# Patient Record
Sex: FEMALE | Race: ASIAN | NOT HISPANIC OR LATINO | URBAN - METROPOLITAN AREA
[De-identification: names, ages, dates, MRNs, and addresses within clinical notes are randomized per-mention and may not be internally consistent; named-entity substitution may affect disease eponyms.]

---

## 2018-01-25 ENCOUNTER — EMERGENCY (EMERGENCY)
Facility: HOSPITAL | Age: 33
LOS: 1 days | Discharge: HOME | End: 2018-01-25
Admitting: EMERGENCY MEDICINE
Payer: OTHER MISCELLANEOUS

## 2018-01-25 VITALS
RESPIRATION RATE: 16 BRPM | TEMPERATURE: 98 F | HEART RATE: 88 BPM | OXYGEN SATURATION: 100 % | DIASTOLIC BLOOD PRESSURE: 71 MMHG | SYSTOLIC BLOOD PRESSURE: 119 MMHG

## 2018-01-25 PROCEDURE — 73100 X-RAY EXAM OF WRIST: CPT | Mod: 26,RT

## 2018-01-25 PROCEDURE — 99283 EMERGENCY DEPT VISIT LOW MDM: CPT | Mod: 25

## 2018-01-25 PROCEDURE — 29130 APPL FINGER SPLINT STATIC: CPT

## 2018-01-25 PROCEDURE — 73130 X-RAY EXAM OF HAND: CPT | Mod: 26,RT

## 2018-01-25 RX ORDER — ACETAMINOPHEN 500 MG
650 TABLET ORAL ONCE
Qty: 0 | Refills: 0 | Status: COMPLETED | OUTPATIENT
Start: 2018-01-25 | End: 2018-01-25

## 2018-01-25 RX ORDER — IBUPROFEN 200 MG
600 TABLET ORAL ONCE
Qty: 0 | Refills: 0 | Status: COMPLETED | OUTPATIENT
Start: 2018-01-25 | End: 2018-01-25

## 2018-01-25 RX ADMIN — Medication 600 MILLIGRAM(S): at 12:41

## 2018-01-25 RX ADMIN — Medication 650 MILLIGRAM(S): at 12:40

## 2018-01-25 NOTE — ED PROVIDER NOTE - MEDICAL DECISION MAKING DETAILS
33 y/o female with pmhx of asthma, presents to ED c/o traumatic right hand pain s/p training for self defense at her [employer] yesterday afternoon. plan: r/o fracture, pain control, reassess

## 2018-01-25 NOTE — ED PROVIDER NOTE - PHYSICAL EXAMINATION
Full ROM of right shoulder, elbow and wrist. Able to wiggle, flex and extend fingers. Capillary refill <2 seconds. Radial pulse 2+. Sensation intact throughout. +tenderness over 3rd and 4th MCP joints and metacarpal shafts. Minimal swelling/bruising. 5mm abrasion over 4th knuckle.

## 2018-01-25 NOTE — ED PROVIDER NOTE - OBJECTIVE STATEMENT
33 y/o female with pmhx of asthma, allergic to eggs, presents to ED c/o traumatic right hand pain s/p training for self defense at her [employer] yesterday afternoon. Right hand dominant. Pt punched a punching bag without gloves. c/o swelling and ecchymosis with a small abrasion. Applied ice right away, swelling improved. Declining tetanus shot due to being scared of needles. 10/10 pain. Did not take any meds today, took Tylenol yesterday with relief. No fever, chills, weakness, numbness, tingling, gross deformity, cyanosis, fall, further trauma or pain, or any other complaints. 33 y/o female with pmhx of asthma, presents to ED c/o traumatic right hand pain s/p training for self defense at her [employer] yesterday afternoon. Right hand dominant. Pt punched a punching bag without gloves. c/o swelling and ecchymosis of her 3rd and 4th knuckles with a small abrasion. Applied ice right away, swelling improved. Declining tetanus shot due to being scared of needles. 10/10 pain. Did not take any meds today, took Tylenol yesterday with relief. No fever, chills, weakness, numbness, tingling, gross deformity, cyanosis, fall, further trauma or pain, or any other complaints.

## 2018-01-25 NOTE — ED POST DISCHARGE NOTE - RESULT SUMMARY
TONY Landry: received call from Dr. Cotton for hand xray official report, possible lucency of 5th base of distal phalynx compared to hand xray from 2008. suggests to correlate clinically for possible fracture or may be incidental artifact. pt was not tender there on exam, full ROM, atraumatic. I called pt at [phone] and denies having any pain there, pain mostly near knuckles. advised to follow up with Orthopedics within 24-48 hours. Pt understands and is amenable with plan. Given return precautions.

## 2018-01-25 NOTE — ED PROVIDER NOTE - PROGRESS NOTE DETAILS
VIJAY Landry - pain well controlled. as per Dr. escobedo negative xrays. will f.u official report. pt amenable for dc with pcp follow up. Mohan Landry - spoke with hand Dr. Asher, plan to place finger splint and to follow up this week. pt amenable with plan. given contact information for office.

## 2018-01-25 NOTE — ED ADULT TRIAGE NOTE - CHIEF COMPLAINT QUOTE
Pt. c/o right hand pain around knuckles since yesterday. States she was punching a bag during training yesterday.  Icy hot applied last night and wrapped with ace bandage. Swelling has improved.

## 2018-06-03 ENCOUNTER — EMERGENCY (EMERGENCY)
Facility: HOSPITAL | Age: 33
LOS: 1 days | Discharge: HOME | End: 2018-06-03
Attending: EMERGENCY MEDICINE | Admitting: EMERGENCY MEDICINE
Payer: MEDICAID

## 2018-06-03 VITALS
OXYGEN SATURATION: 100 % | RESPIRATION RATE: 16 BRPM | HEART RATE: 82 BPM | SYSTOLIC BLOOD PRESSURE: 107 MMHG | TEMPERATURE: 98 F | DIASTOLIC BLOOD PRESSURE: 65 MMHG

## 2018-06-03 PROCEDURE — 99283 EMERGENCY DEPT VISIT LOW MDM: CPT

## 2018-06-03 RX ORDER — DEXAMETHASONE 0.5 MG/5ML
10 ELIXIR ORAL ONCE
Qty: 0 | Refills: 0 | Status: COMPLETED | OUTPATIENT
Start: 2018-06-03 | End: 2018-06-03

## 2018-06-03 RX ORDER — IBUPROFEN 200 MG
600 TABLET ORAL ONCE
Qty: 0 | Refills: 0 | Status: COMPLETED | OUTPATIENT
Start: 2018-06-03 | End: 2018-06-03

## 2018-06-03 RX ADMIN — Medication 600 MILLIGRAM(S): at 16:02

## 2018-06-03 RX ADMIN — Medication 30 MILLILITER(S): at 16:02

## 2018-06-03 RX ADMIN — Medication 10 MILLIGRAM(S): at 16:09

## 2018-06-03 NOTE — ED PROVIDER NOTE - MEDICAL DECISION MAKING DETAILS
pt vss, hd stable. with viral like illness. ear wre wnl tm. no fever. stable for d/c with f/u with pmd.

## 2018-06-03 NOTE — ED PROVIDER NOTE - OBJECTIVE STATEMENT
31 y/o f wit hno pmhx p/w epigastric pain, body aches, low grade fever and right sided ear pain. able to tolerate po. no resp distress. neg cp. vss here. states pos sick contact via father. pt states did not take any meds today. no dec hearing. no n/v/d. nmo recent travel, rash, n/v/d.

## 2018-06-03 NOTE — ED ADULT TRIAGE NOTE - CHIEF COMPLAINT QUOTE
Pt c/o fever,  throat discomfort abd pain, N/V, headache and R ear pain, x 3 days.  Pt states , her father had the same symptoms

## 2019-02-22 NOTE — ED PROVIDER NOTE - NS ED ATTENDING STATEMENT MOD
I will START or STAY ON the medications listed below when I get home from the hospital:    Naprosyn 250 mg oral tablet  -- 1 tab(s) by mouth 2 times a day, As Needed  -- Indication: For Pain med    Tylenol 500 mg oral tablet  -- 2 tab(s) by mouth every 6 hours, As Needed  -- Indication: For Pain med     Claritin 10 mg oral tablet  -- 1 tab(s) by mouth 2 times a day  -- Indication: For home med    OTEZLA       TAB 30MG  -- orally once a day  -- Indication: For claire emed    CLOTRIM/BETA CRE DIPROP  -- Apply on skin to affected area prn, As Needed  -- Indication: For claire emed    AUBAGIO      TAB 14MG  -- orally once a day  -- Indication: For home med    ESTARYLLA    TAB 0.25-35  -- orally once a day (at bedtime)  -- Indication: For home med    biotin 1000 mcg oral tablet  -- 1 tab(s) by mouth once a day  -- Indication: For home med Attending Only

## 2019-06-29 ENCOUNTER — RESULT REVIEW (OUTPATIENT)
Age: 34
End: 2019-06-29

## 2020-11-21 ENCOUNTER — RESULT REVIEW (OUTPATIENT)
Age: 35
End: 2020-11-21

## 2023-01-01 NOTE — ED ADULT TRIAGE NOTE - MEANS OF ARRIVAL
MOM NOTIFIED THAT RSV IS POSITIVE. PT IS WHEEZING A LITTLE BIT TODAY NOW PER MOM. ADVISED PT SHOULD BE BROUGHT BACK FOR APPT TO EVALUATE HER BREATHING STATUS. CALL TRANSFERRED TO  TO MAKE APPT  FOR TODAY. ambulatory

## 2024-05-16 ENCOUNTER — EMERGENCY (EMERGENCY)
Facility: HOSPITAL | Age: 39
LOS: 1 days | Discharge: HOME | End: 2024-05-16
Attending: EMERGENCY MEDICINE | Admitting: EMERGENCY MEDICINE
Payer: COMMERCIAL

## 2024-05-16 VITALS
TEMPERATURE: 99 F | DIASTOLIC BLOOD PRESSURE: 72 MMHG | SYSTOLIC BLOOD PRESSURE: 116 MMHG | OXYGEN SATURATION: 99 % | RESPIRATION RATE: 18 BRPM | HEART RATE: 99 BPM

## 2024-05-16 PROBLEM — J45.909 UNSPECIFIED ASTHMA, UNCOMPLICATED: Chronic | Status: ACTIVE | Noted: 2018-01-25

## 2024-05-16 LAB
FLUAV AG NPH QL: SIGNIFICANT CHANGE UP
FLUBV AG NPH QL: SIGNIFICANT CHANGE UP
RSV RNA NPH QL NAA+NON-PROBE: SIGNIFICANT CHANGE UP
SARS-COV-2 RNA SPEC QL NAA+PROBE: SIGNIFICANT CHANGE UP

## 2024-05-16 PROCEDURE — 99284 EMERGENCY DEPT VISIT MOD MDM: CPT

## 2024-05-16 RX ORDER — DEXAMETHASONE 0.5 MG/5ML
10 ELIXIR ORAL ONCE
Refills: 0 | Status: COMPLETED | OUTPATIENT
Start: 2024-05-16 | End: 2024-05-16

## 2024-05-16 RX ORDER — IBUPROFEN 200 MG
600 TABLET ORAL ONCE
Refills: 0 | Status: COMPLETED | OUTPATIENT
Start: 2024-05-16 | End: 2024-05-16

## 2024-05-16 RX ORDER — DEXAMETHASONE 0.5 MG/5ML
10 ELIXIR ORAL ONCE
Refills: 0 | Status: DISCONTINUED | OUTPATIENT
Start: 2024-05-16 | End: 2024-05-16

## 2024-05-16 RX ORDER — SODIUM CHLORIDE 0.65 %
1 AEROSOL, SPRAY (ML) NASAL ONCE
Refills: 0 | Status: COMPLETED | OUTPATIENT
Start: 2024-05-16 | End: 2024-05-16

## 2024-05-16 RX ORDER — IPRATROPIUM/ALBUTEROL SULFATE 18-103MCG
3 AEROSOL WITH ADAPTER (GRAM) INHALATION ONCE
Refills: 0 | Status: COMPLETED | OUTPATIENT
Start: 2024-05-16 | End: 2024-05-16

## 2024-05-16 RX ADMIN — Medication 600 MILLIGRAM(S): at 17:27

## 2024-05-16 RX ADMIN — Medication 3 MILLILITER(S): at 17:23

## 2024-05-16 RX ADMIN — Medication 10 MILLIGRAM(S): at 17:27

## 2024-05-16 RX ADMIN — Medication 600 MILLIGRAM(S): at 16:43

## 2024-05-16 RX ADMIN — Medication 1 SPRAY(S): at 16:43

## 2024-05-16 NOTE — ED PROVIDER NOTE - ATTENDING CONTRIBUTION TO CARE
Patient is a 38-year-old female with a history of asthma, here for evaluation of 5-6 days of sore throat, fevers, myalgias.  Patient denies any  recent travel.  She does report that her niece has similar symptoms.  No nausea, vomiting or diarrhea.  She has a dry cough.  She reports she has been using her inhaler a bit more frequently.  She reports facial pain.  No urinary symptoms.  No rash.  Patient reports fevers every night around 101–102. Patient is requesting a work note.  She states that she was seen at urgent care few days ago  And was tested for COVID which was negative at that time.  She states she took a home test that was also negative.    VS noted  Gen. no acute distress, Non toxic   HEENT: EOMI, mmm, mild erythema in pharynx, no exudate, mild tenderness to bilateral maxillary sinuses  Lungs:  mild expiratory wheeze, scattered, speaking in full sentences, not tachypneic  CVS: RRR   Abd; Soft non tender, non distended   Ext: no edema  Skin: no rash  Neuro AAOx3 non focal clear speech  a/p:  fever, sore throat, myalgias–patient likely has a viral illness.  She has a sick contact with a niece who has similar symptoms.  Patient reports a dry cough.  She has no chest pain or shortness of breath.  I discussed getting a chest x-ray with patient and she agrees that at this time she does not think it is necessary.  Patient has no urinary symptoms.  At this time, plan for pain control, Decadron, albuterol, flu/COVID test and likely discharge home.   Patient has to follow-up with PCP early next week.  Return precautions discussed such as new symptoms, such as chest pain or shortness of breath, persistent fevers, nausea, vomiting.  Patient expressed understanding.  Lenora Marie MD

## 2024-05-16 NOTE — ED ADULT NURSE NOTE - OBJECTIVE STATEMENT
patient AOX4 came in c/o flu like symptoms started since few days with body aches. NAD. meds given as ordered. labs done as ordered. pending reslts and re eval.

## 2024-05-16 NOTE — ED PROVIDER NOTE - PHYSICAL EXAMINATION
Gen: well appearing, in no acute distress   Head: normal appearing  HEENT: normal conjunctiva, oral mucosa moist, vision grossly intact, + L maxillary sinus tenderness most prominent, also with ROWAN frontal sinus tenderness, edematous nasal mucosa   Lung: no respiratory distress, speaking in full sentences, occasional expiratory wheeze   CV: regular rate and rhythm, no murmurs  Abd: soft, non distended, non tender   MSK: no visible deformities, ambulating without difficulty   Neuro: No focal deficits, AAOx3  Skin: Warm, no rashes   Psych: normal affect

## 2024-05-16 NOTE — ED PROVIDER NOTE - OBJECTIVE STATEMENT
38-year-old female with no significant past medical history presents to emergency department for evaluation of headache, nasal congestion and runny nose x 4 days.  Patient states positive sick contact at home, niece with similar symptoms.  Has also had fevers nightly for the past 3 days with a Tmax of 101.  Was evaluated at urgent care 2 days ago, tested negative for flu and was given an inhaler because she was found to have mild wheeze.  Presenting today because she is concerned she will be unable to go to work tonight as her symptoms have persisted.  Denies chest pain, difficulty breathing, cough, abdominal pain, nausea/vomiting/diarrhea, materia, dysuria or sore throat. NKDA. Allergy to dust.

## 2024-05-16 NOTE — ED PROVIDER NOTE - NSFOLLOWUPINSTRUCTIONS_ED_ALL_ED_FT
Today you were seen in the emergency department for evaluation of your viral symptoms.     A viral respiratory infection is an illness that affects parts of the body used for breathing, like the lungs, nose, and throat. It is caused by a germ called a virus. Symptoms can include runny nose, coughing, sneezing, fatigue, body aches, sore throat, fever, or headache. Over the counter medicine can be used to manage the symptoms but the infection typically goes away on its own in 5 to 10 days.     Today in the emergency department you were given medications to help with nasal congestion.     You can try over the counter medications such as Mucinex and continue using nasal sprays or sinus rinses to keep your sinuses clear.     Please continue to use the albuterol inhaler provided by your outpatient doctor.     SEEK IMMEDIATE MEDICAL CARE IF YOU HAVE ANY OF THE FOLLOWING SYMPTOMS: shortness of breath, chest pain, fever over 10 days, or lightheadedness/dizziness.

## 2024-05-16 NOTE — ED PROVIDER NOTE - PATIENT PORTAL LINK FT
You can access the FollowMyHealth Patient Portal offered by [facility] by registering at the following website: [email or web address]. By joining [facility]’s FollowMyHealth portal, you will also be able to view your health information using other applications (apps) compatible with our system.

## 2024-05-16 NOTE — ED PROVIDER NOTE - CLINICAL SUMMARY MEDICAL DECISION MAKING FREE TEXT BOX
38-year-old female with no significant past medical history presents to emergency department for evaluation of headache, nasal congestion and runny nose x 4 days.  Patient states positive sick contact at home, niece with similar symptoms. Patient is well appearing on exam, with mild sinus tenderness, sounding congested with mildly erythematous oropharynx consistent with post nasal drip. Will give medications for supportive care and d/c.

## 2024-11-23 ENCOUNTER — EMERGENCY (EMERGENCY)
Facility: HOSPITAL | Age: 39
LOS: 1 days | Discharge: HOME | End: 2024-11-23
Attending: EMERGENCY MEDICINE | Admitting: EMERGENCY MEDICINE
Payer: COMMERCIAL

## 2024-11-23 VITALS
RESPIRATION RATE: 17 BRPM | SYSTOLIC BLOOD PRESSURE: 144 MMHG | OXYGEN SATURATION: 98 % | DIASTOLIC BLOOD PRESSURE: 85 MMHG | WEIGHT: 156.09 LBS | HEART RATE: 96 BPM | TEMPERATURE: 98 F

## 2024-11-23 VITALS
SYSTOLIC BLOOD PRESSURE: 124 MMHG | HEART RATE: 81 BPM | DIASTOLIC BLOOD PRESSURE: 82 MMHG | OXYGEN SATURATION: 100 % | RESPIRATION RATE: 18 BRPM | TEMPERATURE: 98 F

## 2024-11-23 LAB — HCG SERPL-ACNC: SIGNIFICANT CHANGE UP MIU/ML

## 2024-11-23 PROCEDURE — 76817 TRANSVAGINAL US OBSTETRIC: CPT | Mod: 26

## 2024-11-23 PROCEDURE — 99284 EMERGENCY DEPT VISIT MOD MDM: CPT

## 2024-11-23 NOTE — ED PROVIDER NOTE - CLINICAL SUMMARY MEDICAL DECISION MAKING FREE TEXT BOX
39 y.o. F, , PMHx significant for Asthma currently 8 weeks pregnant, p/w mild spotting. Physical exam notable for well-appearing female in no acute distress.  Vaginal exam with no blood in the vaginal vault, cervix is closed.  There is normal physiologic discharge.  Will obtain hCG and transvaginal ultrasound to evaluate for intrauterine pregnancy and fetal heart rate.

## 2024-11-23 NOTE — ED PROVIDER NOTE - PATIENT PORTAL LINK FT
You can access the FollowMyHealth Patient Portal offered by [facility] by registering at the following website: [email or web address]. By joining [facility]’s FollowMyHealth portal, you will also be able to view your health information using other applications (apps) compatible with our system.

## 2024-11-23 NOTE — ED PROVIDER NOTE - NSFOLLOWUPCLINICS_GEN_ALL_ED_FT
[facility]  OB/GYN & Surg  [address] [place] [id number]  Phone: [phone]  Fax:   Follow Up Time: 1-3 Days    [facility]  Gynceology/OB  [address] [place] [address]  Phone: [phone]  Fax:   Follow Up Time: 1-3 Days    [facility]  OBGYN  [address] [place] [address]  Phone: [phone]  Fax: [phone]  Follow Up Time: 1-3 Days

## 2024-11-23 NOTE — ED ADULT NURSE NOTE - DRUG PRE-SCREENING (DAST -1)
Spoke to patient.  Patient verified name and date of birth.  Patient requests below records -path and OV notes- to be sent to [facility] as requested.  12/20/23, 12/19/22, and 4/12/21 OV notes along with 4/12/21 derm pathology faxed to [facility] per request at [phone]   Statement Selected

## 2024-11-23 NOTE — ED PROVIDER NOTE - ATTENDING CONTRIBUTION TO CARE
39 y.o. F, , PMHx significant for Asthma currently 8 weeks pregnant, p/w mild spotting. Physical exam notable for well-appearing female in no acute distress.  Vaginal exam with no blood in the vaginal vault, cervix is closed.  There is normal physiologic discharge.  Will obtain hCG and transvaginal ultrasound to evaluate for intrauterine pregnancy and fetal heart rate.  pending Us, labs, and likely d/c.

## 2024-11-23 NOTE — ED ADULT NURSE NOTE - NSFALLUNIVINTERV_ED_ALL_ED
Bed/Stretcher in lowest position, wheels locked, appropriate side rails in place/Call bell, personal items and telephone in reach/Instruct patient to call for assistance before getting out of bed/chair/stretcher/Non-slip footwear applied when patient is off stretcher/[place] to call system/Physically safe environment - no spills, clutter or unnecessary equipment/Purposeful proactive rounding/Room/bathroom lighting operational, light cord in reach

## 2024-11-23 NOTE — ED PROVIDER NOTE - OBJECTIVE STATEMENT
39 y.o. F, , PMHx significant for Asthma currently 8 weeks pregnant, p/w mild spotting. Patient went for follow-up with her OB/GYN Dr.Nadia Schneider 2 days ago where she was prompted to have ultrasound yesterday. Ultrasound yesterday did not show fetal heart rate however did show IUP.  Later that day, patient experienced scant brownish discharge with mild lower abdominal cramping which prompted visit to the ED today. No other symptoms reported.

## 2024-11-23 NOTE — ED PROVIDER NOTE - PHYSICAL EXAMINATION
GEN: Patient awake and alert. No acute distress, non-toxic.  Head: normocephalic, atraumatic  Neck: Nontender, full ROM  Eyes: EOMI  CARDIAC: RRR.  No murmur. No peripheral edema noted.  PULM: CTA B/L no wheeze, rales or rhonchi. No signs of respiratory distress, no accessory muscle usage or nasal flaring.  ABD: Soft, nontender, nondistended. No rebound, no involuntary guarding.  : Vaginal exam notable for normal physiologic discharge in the vaginal vault.  No blood appreciated.  Cervix is closed.  MSK: Moving all extremities spontaneously.  NEURO: A&Ox3. Gait normal.  SKIN: Warm, dry, no rash

## 2024-11-23 NOTE — ED PROVIDER NOTE - NSFOLLOWUPINSTRUCTIONS_ED_ALL_ED_FT
You were seen in emergency department for vaginal spotting.  The ultrasound we obtain did show a single 6 week 4 day intrauterine gestation without evidence of a fetal heartbeat".  Given this finding, you are experiencing a miscarriage. At this time, I am recommending you follow up with your OB/GYN for further management and to follow your hCG levels.     Please make an appointment to be seen by an Ob in the next 2 days. Listed below is the contact information for OB/GYN    If you develop fevers, severe abd pain, difficulty with urination, severe bleeding or for any questions or concerns please return to the emergency department.

## 2024-11-23 NOTE — ED ADULT TRIAGE NOTE - CHIEF COMPLAINT QUOTE
Pt arrives ambulatory to triage, endorses no fetal HR on ultrasound the last 2 days. Also w/ mild spotting & pelvic pain last night. Pt is 8 wks pregnant. . PHx: asthma.

## 2024-11-23 NOTE — ED PROVIDER NOTE - PROGRESS NOTE DETAILS
TVUS: "Single 6 week 4 day intrauterine gestation without evidence of a fetal heartbeat compatible with pregnancy failure" will discharge patient with follow-up to OB/GYN.

## 2024-11-23 NOTE — ED ADULT NURSE NOTE - OBJECTIVE STATEMENT
Patient A&o X4, received in intake , with complaints of pregnancy problems. Patient states, "I am 8 weeks pregnant and my doctor told me two days ago there is no fetal heartbeat ". Patient also endorses light vaginal spotting that started last night and has since subsided. Patient denies any abdominal pain or cramping at this time. Patient able to speak in clear sentences, respirations equal and unlabored. Lung sounds clear b/l, equal chest rise and fall noted. Denies CP/SOB, fever, chills, nausea, vomiting and diarrhea at this time. Skin warm and dry. Provider at bedside for eval, pending further orders.

## 2024-12-13 ENCOUNTER — EMERGENCY (EMERGENCY)
Facility: HOSPITAL | Age: 39
LOS: 1 days | Discharge: HOME | End: 2024-12-13
Attending: EMERGENCY MEDICINE | Admitting: EMERGENCY MEDICINE
Payer: COMMERCIAL

## 2024-12-13 VITALS
OXYGEN SATURATION: 99 % | TEMPERATURE: 99 F | RESPIRATION RATE: 16 BRPM | HEART RATE: 114 BPM | SYSTOLIC BLOOD PRESSURE: 100 MMHG | DIASTOLIC BLOOD PRESSURE: 78 MMHG

## 2024-12-13 VITALS
OXYGEN SATURATION: 99 % | SYSTOLIC BLOOD PRESSURE: 112 MMHG | HEART RATE: 93 BPM | RESPIRATION RATE: 18 BRPM | TEMPERATURE: 99 F | DIASTOLIC BLOOD PRESSURE: 71 MMHG

## 2024-12-13 LAB
ALBUMIN SERPL ELPH-MCNC: 4.4 G/DL — SIGNIFICANT CHANGE UP (ref 3.3–5)
ALP SERPL-CCNC: 85 U/L — SIGNIFICANT CHANGE UP (ref 40–120)
ALT FLD-CCNC: 19 U/L — SIGNIFICANT CHANGE UP (ref 4–33)
ANION GAP SERPL CALC-SCNC: 14 MMOL/L — SIGNIFICANT CHANGE UP (ref 7–14)
APPEARANCE UR: ABNORMAL
AST SERPL-CCNC: 18 U/L — SIGNIFICANT CHANGE UP (ref 4–32)
BACTERIA # UR AUTO: ABNORMAL /HPF
BASOPHILS # BLD AUTO: 0.09 K/UL — SIGNIFICANT CHANGE UP (ref 0–0.2)
BASOPHILS NFR BLD AUTO: 0.6 % — SIGNIFICANT CHANGE UP (ref 0–2)
BILIRUB SERPL-MCNC: 0.2 MG/DL — SIGNIFICANT CHANGE UP (ref 0.2–1.2)
BILIRUB UR-MCNC: NEGATIVE — SIGNIFICANT CHANGE UP
BLOOD GAS VENOUS COMPREHENSIVE RESULT: SIGNIFICANT CHANGE UP
BUN SERPL-MCNC: 8 MG/DL — SIGNIFICANT CHANGE UP (ref 7–23)
CALCIUM SERPL-MCNC: 9.5 MG/DL — SIGNIFICANT CHANGE UP (ref 8.4–10.5)
CAST: 2 /LPF — SIGNIFICANT CHANGE UP (ref 0–4)
CHLORIDE SERPL-SCNC: 101 MMOL/L — SIGNIFICANT CHANGE UP (ref 98–107)
CO2 SERPL-SCNC: 22 MMOL/L — SIGNIFICANT CHANGE UP (ref 22–31)
COLOR SPEC: SIGNIFICANT CHANGE UP
CREAT SERPL-MCNC: 0.8 MG/DL — SIGNIFICANT CHANGE UP (ref 0.5–1.3)
DIFF PNL FLD: NEGATIVE — SIGNIFICANT CHANGE UP
EGFR: 96 ML/MIN/1.73M2 — SIGNIFICANT CHANGE UP
EOSINOPHIL # BLD AUTO: 0.06 K/UL — SIGNIFICANT CHANGE UP (ref 0–0.5)
EOSINOPHIL NFR BLD AUTO: 0.4 % — SIGNIFICANT CHANGE UP (ref 0–6)
FLUAV AG NPH QL: SIGNIFICANT CHANGE UP
FLUBV AG NPH QL: SIGNIFICANT CHANGE UP
GLUCOSE SERPL-MCNC: 118 MG/DL — HIGH (ref 70–99)
GLUCOSE UR QL: NEGATIVE MG/DL — SIGNIFICANT CHANGE UP
HCG SERPL-ACNC: 7.6 MIU/ML — SIGNIFICANT CHANGE UP
HCT VFR BLD CALC: 36.7 % — SIGNIFICANT CHANGE UP (ref 34.5–45)
HGB BLD-MCNC: 12.3 G/DL — SIGNIFICANT CHANGE UP (ref 11.5–15.5)
IANC: 12.18 K/UL — HIGH (ref 1.8–7.4)
IMM GRANULOCYTES NFR BLD AUTO: 0.5 % — SIGNIFICANT CHANGE UP (ref 0–0.9)
KETONES UR-MCNC: 15 MG/DL
LEUKOCYTE ESTERASE UR-ACNC: NEGATIVE — SIGNIFICANT CHANGE UP
LIDOCAIN IGE QN: 34 U/L — SIGNIFICANT CHANGE UP (ref 7–60)
LYMPHOCYTES # BLD AUTO: 15.2 % — SIGNIFICANT CHANGE UP (ref 13–44)
LYMPHOCYTES # BLD AUTO: 2.41 K/UL — SIGNIFICANT CHANGE UP (ref 1–3.3)
MCHC RBC-ENTMCNC: 27.9 PG — SIGNIFICANT CHANGE UP (ref 27–34)
MCHC RBC-ENTMCNC: 33.5 G/DL — SIGNIFICANT CHANGE UP (ref 32–36)
MCV RBC AUTO: 83.2 FL — SIGNIFICANT CHANGE UP (ref 80–100)
MONOCYTES # BLD AUTO: 1.02 K/UL — HIGH (ref 0–0.9)
MONOCYTES NFR BLD AUTO: 6.4 % — SIGNIFICANT CHANGE UP (ref 2–14)
NEUTROPHILS # BLD AUTO: 12.18 K/UL — HIGH (ref 1.8–7.4)
NEUTROPHILS NFR BLD AUTO: 76.9 % — SIGNIFICANT CHANGE UP (ref 43–77)
NITRITE UR-MCNC: NEGATIVE — SIGNIFICANT CHANGE UP
NRBC # BLD: 0 /100 WBCS — SIGNIFICANT CHANGE UP (ref 0–0)
NRBC # FLD: 0 K/UL — SIGNIFICANT CHANGE UP (ref 0–0)
PH UR: 5.5 — SIGNIFICANT CHANGE UP (ref 5–8)
PLATELET # BLD AUTO: 340 K/UL — SIGNIFICANT CHANGE UP (ref 150–400)
POTASSIUM SERPL-MCNC: 3.8 MMOL/L — SIGNIFICANT CHANGE UP (ref 3.5–5.3)
POTASSIUM SERPL-SCNC: 3.8 MMOL/L — SIGNIFICANT CHANGE UP (ref 3.5–5.3)
PROT SERPL-MCNC: 8.6 G/DL — HIGH (ref 6–8.3)
PROT UR-MCNC: 100 MG/DL
RBC # BLD: 4.41 M/UL — SIGNIFICANT CHANGE UP (ref 3.8–5.2)
RBC # FLD: 14.6 % — HIGH (ref 10.3–14.5)
RBC CASTS # UR COMP ASSIST: 4 /HPF — SIGNIFICANT CHANGE UP (ref 0–4)
RSV RNA NPH QL NAA+NON-PROBE: SIGNIFICANT CHANGE UP
SARS-COV-2 RNA SPEC QL NAA+PROBE: SIGNIFICANT CHANGE UP
SODIUM SERPL-SCNC: 137 MMOL/L — SIGNIFICANT CHANGE UP (ref 135–145)
SP GR SPEC: 1.03 — HIGH (ref 1–1.03)
SQUAMOUS # UR AUTO: 7 /HPF — HIGH (ref 0–5)
UROBILINOGEN FLD QL: 0.2 MG/DL — SIGNIFICANT CHANGE UP (ref 0.2–1)
WBC # BLD: 15.84 K/UL — HIGH (ref 3.8–10.5)
WBC # FLD AUTO: 15.84 K/UL — HIGH (ref 3.8–10.5)
WBC UR QL: 3 /HPF — SIGNIFICANT CHANGE UP (ref 0–5)

## 2024-12-13 PROCEDURE — 93010 ELECTROCARDIOGRAM REPORT: CPT

## 2024-12-13 PROCEDURE — 76830 TRANSVAGINAL US NON-OB: CPT | Mod: 26

## 2024-12-13 PROCEDURE — 99285 EMERGENCY DEPT VISIT HI MDM: CPT

## 2024-12-13 PROCEDURE — 74177 CT ABD & PELVIS W/CONTRAST: CPT | Mod: 26,MC

## 2024-12-13 RX ORDER — SODIUM CHLORIDE 9 MG/ML
1000 INJECTION, SOLUTION INTRAMUSCULAR; INTRAVENOUS; SUBCUTANEOUS ONCE
Refills: 0 | Status: COMPLETED | OUTPATIENT
Start: 2024-12-13 | End: 2024-12-13

## 2024-12-13 RX ORDER — KETOROLAC TROMETHAMINE 30 MG/ML
15 INJECTION INTRAMUSCULAR; INTRAVENOUS ONCE
Refills: 0 | Status: DISCONTINUED | OUTPATIENT
Start: 2024-12-13 | End: 2024-12-13

## 2024-12-13 RX ORDER — AMOXICILLIN 250 MG
1 CAPSULE ORAL
Qty: 20 | Refills: 0
Start: 2024-12-13 | End: 2024-12-22

## 2024-12-13 RX ADMIN — SODIUM CHLORIDE 1000 MILLILITER(S): 9 INJECTION, SOLUTION INTRAMUSCULAR; INTRAVENOUS; SUBCUTANEOUS at 13:02

## 2024-12-13 RX ADMIN — KETOROLAC TROMETHAMINE 15 MILLIGRAM(S): 30 INJECTION INTRAMUSCULAR; INTRAVENOUS at 13:02

## 2024-12-13 RX ADMIN — KETOROLAC TROMETHAMINE 15 MILLIGRAM(S): 30 INJECTION INTRAMUSCULAR; INTRAVENOUS at 14:00

## 2024-12-13 NOTE — ED PROVIDER NOTE - ATTENDING CONTRIBUTION TO CARE
Dr. Dorsey:  I have personally performed a face to face bedside history and physical examination of this patient. I have discussed the history, examination, review of systems, assessment and plan of management with the resident. I have reviewed the electronic medical record and amended it to reflect my history, review of systems, physical exam, assessment and plan.    39F c/o 2-3 days of sore throat, intermittent fevers, and low abdominal pain.  Denies cp, sob, n/v/d, urinary symptoms.  Had a miscarriage last month, took methotrexate around Thanksgiving.  Sister recently traveled to [place] and was sick, +virus going through the house.      Exam:  - nad  - rrr  - ctab  - abd soft, TTP lower abdomen/pelvic area.    A/P  - sore throat, Dr. Dorsey:  I have personally performed a face to face bedside history and physical examination of this patient. I have discussed the history, examination, review of systems, assessment and plan of management with the resident. I have reviewed the electronic medical record and amended it to reflect my history, review of systems, physical exam, assessment and plan.    39F c/o 2-3 days of sore throat, intermittent fevers, and low abdominal pain.  Denies cp, sob, n/v/d, urinary symptoms.  Had a miscarriage last month, took methotrexate around Thanksgiving.  Sister recently traveled to [place] and was sick, +virus going through the house.      Exam:  - nad  - erythematous and swollen pharynx with white exudates on tonsils; +cervical lymphadenopathy  - rrr  - ctab  - abd soft, TTP lower abdomen/pelvic area, particularly RLQ & suprapubic area  - pelvic exam with no uterine/adnexal TTP, no CMT    A/P  - sore throat, suspect strep throat  - basic labs, CT abd/pelvis to r/o appendicitis, TVUS to r/o retained products given recent miscarriage; will swab for strep throat

## 2024-12-13 NOTE — ED PROVIDER NOTE - NSFOLLOWUPINSTRUCTIONS_ED_ALL_ED_FT
You will need further medical care and evaluation. A presumptive diagnosis of strep pharyngitis has been made, however further evaluation may be required by your primary care doctor or specialist for a definitive diagnosis.      We took a throat culture today. If positive, you will receive a call with the results.    We sent a antibiotic to your pharmacy. Please take Amoxicillin for 10 days.    Follow up with your medical doctor in 2-3 days or call our clinic at [phone] and state you were seen in the Emergency Department and would like to be seen in clinic. You may also call [phone] to speak with a representative to assist follow up care with medicine, surgery, or specialists.    You can take tylenol and ibuprofen for pain.    IF you have difficulty breathing, difficulty swallowing, worsening pain, worsening fever, please return to the emergency room.  Return for any persistent, worsening symptoms, or ANY concerns at all.

## 2024-12-13 NOTE — ED ADULT TRIAGE NOTE - CHIEF COMPLAINT QUOTE
Pt c/o sore throat, headache, fever and weakness. Also endorsing abd cramping. denies n/v, chest pain. pmhx: asthma

## 2024-12-13 NOTE — ED PROVIDER NOTE - ADDITIONAL NOTES AND INSTRUCTIONS:
Patient Education        Nausea and Vomiting: Care Instructions  Your Care Instructions    When you are nauseated, you may feel weak and sweaty and notice a lot of saliva in your mouth. Nausea often leads to vomiting. Most of the time you do not need to worry about nausea and vomiting, but they can be signs of other illnesses. Two common causes of nausea and vomiting are stomach flu and food poisoning. Nausea and vomiting from viral stomach flu will usually start to improve within 24 hours. Nausea and vomiting from food poisoning may last from 12 to 48 hours. The doctor has checked you carefully, but problems can develop later. If you notice any problems or new symptoms, get medical treatment right away. Follow-up care is a key part of your treatment and safety. Be sure to make and go to all appointments, and call your doctor if you are having problems. It's also a good idea to know your test results and keep a list of the medicines you take. How can you care for yourself at home? · To prevent dehydration, drink plenty of fluids, enough so that your urine is light yellow or clear like water. Choose water and other caffeine-free clear liquids until you feel better. If you have kidney, heart, or liver disease and have to limit fluids, talk with your doctor before you increase the amount of fluids you drink. · Rest in bed until you feel better. · When you are able to eat, try clear soups, mild foods, and liquids until all symptoms are gone for 12 to 48 hours. Other good choices include dry toast, crackers, cooked cereal, and gelatin dessert, such as Jell-O. When should you call for help? Call 911 anytime you think you may need emergency care. For example, call if:    · You passed out (lost consciousness).    Call your doctor now or seek immediate medical care if:    · You have symptoms of dehydration, such as:  ? Dry eyes and a dry mouth. ? Passing only a little dark urine. ?  Feeling thirstier than usual.   · You have new or worsening belly pain.     · You have a new or higher fever.     · You vomit blood or what looks like coffee grounds.    Watch closely for changes in your health, and be sure to contact your doctor if:    · You have ongoing nausea and vomiting.     · Your vomiting is getting worse.     · Your vomiting lasts longer than 2 days.     · You are not getting better as expected. Where can you learn more? Go to http://tre-mari.info/. Enter 25 [id number] in the search box to learn more about \"Nausea and Vomiting: Care Instructions. \"  Current as of: June 26, 2019  Content Version: 12.2  © 4148-7361 [employer], [email or web address]. Care instructions adapted under license by [employer] (which disclaims liability or warranty for this information). If you have questions about a medical condition or this instruction, always ask your healthcare professional. [address] any warranty or liability for your use of this information. Please excuse from work for acute illness.

## 2024-12-13 NOTE — ED PROVIDER NOTE - PATIENT PORTAL LINK FT
You can access the FollowMyHealth Patient Portal offered by [facility] by registering at the following website: [email or web address]. By joining [employer]s FollowMyHealth portal, you will also be able to view your health information using other applications (apps) compatible with our system.

## 2024-12-13 NOTE — ED ADULT NURSE NOTE - OBJECTIVE STATEMENT
pt received to intake 12, A&Ox4, denies past medical hx, coming to ED c/o lower abdominal pain, intermittent fever, headache and generalized weakness x2 days. Had a miscarriage last month, took methotrexate around Thanksgiving.  Denies vaginal bleeding, chest pain, SOB, N/V, diarrhea, or urinary symptoms. RR even and unlabored. abdomen soft, non-distended, but tender to b/l lower quadrants. No neuro deficits noted. skin clean dry and intact. 20 G IV placed to left AC. labs drawn and sent. medicated as ordered. safety maintained. awaiting lab results and imaging.

## 2024-12-13 NOTE — ED ADULT NURSE NOTE - NSFALLUNIVINTERV_ED_ALL_ED
Bed/Stretcher in lowest position, wheels locked, appropriate side rails in place/Call bell, personal items and telephone in reach/Instruct patient to call for assistance before getting out of bed/chair/stretcher/Non-slip footwear applied when patient is off stretcher/[place] to call system/Physically safe environment - no spills, clutter or unnecessary equipment/Purposeful proactive rounding/Room/bathroom lighting operational, light cord in reach

## 2024-12-13 NOTE — ED PROVIDER NOTE - PROGRESS NOTE DETAILS
Layo, PGY1: patient states she feels subjectively better. CT and US were unremarkable. UA negative. Labs non-actionable. Likely strep pharyngitis given exam. Will d/c with abx.

## 2024-12-14 LAB
CULTURE RESULTS: SIGNIFICANT CHANGE UP
S PYO DNA THROAT QL NAA+PROBE: ABNORMAL
SPECIMEN SOURCE: SIGNIFICANT CHANGE UP

## 2024-12-17 LAB
C TRACH RRNA SPEC QL NAA+PROBE: SIGNIFICANT CHANGE UP
N GONORRHOEA RRNA SPEC QL NAA+PROBE: SIGNIFICANT CHANGE UP
SPECIMEN SOURCE: SIGNIFICANT CHANGE UP

## 2024-12-26 ENCOUNTER — NON-APPOINTMENT (OUTPATIENT)
Age: 39
End: 2024-12-26

## 2025-01-03 ENCOUNTER — NON-APPOINTMENT (OUTPATIENT)
Age: 40
End: 2025-01-03

## 2025-02-13 ENCOUNTER — NON-APPOINTMENT (OUTPATIENT)
Age: 40
End: 2025-02-13